# Patient Record
Sex: FEMALE | Race: WHITE | Employment: OTHER | URBAN - METROPOLITAN AREA
[De-identification: names, ages, dates, MRNs, and addresses within clinical notes are randomized per-mention and may not be internally consistent; named-entity substitution may affect disease eponyms.]

---

## 2018-11-02 ENCOUNTER — TELEPHONE (OUTPATIENT)
Dept: ADMINISTRATIVE | Age: 83
End: 2018-11-02

## 2018-11-07 ENCOUNTER — OFFICE VISIT (OUTPATIENT)
Dept: ENDOCRINOLOGY | Age: 83
End: 2018-11-07
Payer: MEDICARE

## 2018-11-07 VITALS
DIASTOLIC BLOOD PRESSURE: 78 MMHG | SYSTOLIC BLOOD PRESSURE: 124 MMHG | OXYGEN SATURATION: 96 % | WEIGHT: 108.6 LBS | RESPIRATION RATE: 16 BRPM | HEIGHT: 62 IN | BODY MASS INDEX: 19.98 KG/M2 | HEART RATE: 83 BPM

## 2018-11-07 DIAGNOSIS — E55.9 VITAMIN D DEFICIENCY: ICD-10-CM

## 2018-11-07 DIAGNOSIS — M81.0 AGE-RELATED OSTEOPOROSIS WITHOUT CURRENT PATHOLOGICAL FRACTURE: ICD-10-CM

## 2018-11-07 DIAGNOSIS — I10 ESSENTIAL HYPERTENSION: ICD-10-CM

## 2018-11-07 DIAGNOSIS — E78.49 OTHER HYPERLIPIDEMIA: ICD-10-CM

## 2018-11-07 PROCEDURE — 99214 OFFICE O/P EST MOD 30 MIN: CPT | Performed by: INTERNAL MEDICINE

## 2018-11-07 RX ORDER — DEXLANSOPRAZOLE 60 MG/1
60 CAPSULE, DELAYED RELEASE ORAL DAILY
COMMUNITY

## 2018-11-07 RX ORDER — SIMVASTATIN 5 MG
5 TABLET ORAL NIGHTLY
COMMUNITY

## 2018-11-07 RX ORDER — BRIMONIDINE TARTRATE, TIMOLOL MALEATE 2; 5 MG/ML; MG/ML
1 SOLUTION/ DROPS OPHTHALMIC EVERY 12 HOURS
COMMUNITY

## 2018-11-07 RX ORDER — METOPROLOL SUCCINATE 25 MG/1
25 TABLET, EXTENDED RELEASE ORAL 2 TIMES DAILY
COMMUNITY
Start: 2017-06-08

## 2018-11-07 RX ORDER — FENOFIBRATE 48 MG/1
48 TABLET, COATED ORAL DAILY
COMMUNITY

## 2018-11-07 RX ORDER — BENZTROPINE MESYLATE 1 MG/1
TABLET ORAL
COMMUNITY
Start: 2018-10-15 | End: 2019-11-07

## 2018-11-07 RX ORDER — BIMATOPROST 0.3 MG/ML
1 SOLUTION/ DROPS OPHTHALMIC NIGHTLY
COMMUNITY

## 2018-11-07 RX ORDER — UBIDECARENONE 75 MG
CAPSULE ORAL
COMMUNITY
Start: 2018-07-10 | End: 2018-11-07

## 2018-11-07 NOTE — LETTER
every other day.  simvastatin (ZOCOR) 5 MG tablet Take 5 mg by mouth nightly       metoprolol succinate (TOPROL XL) 25 MG extended release tablet Take 25 mg by mouth 2 times daily       fenofibrate (TRICOR) 48 MG tablet Take 48 mg by mouth daily       dexlansoprazole (DEXILANT) 60 MG CPDR delayed release capsule Take 60 mg by mouth daily       Cholecalciferol (VITAMIN D3) 1000 UNIT/SPRAY LIQD Take by mouth daily       brimonidine-timolol (COMBIGAN) 0.2-0.5 % ophthalmic solution Place 1 drop into both eyes every 12 hours       bimatoprost (LUMIGAN) 0.03 % ophthalmic drops Place 1 drop into both eyes nightly       benztropine (COGENTIN) 1 MG tablet TAKE 1 TABLET BY MOUTH TWICE A DAY      denosumab (PROLIA) 60 MG/ML SOLN SC injection Inject 1 mL into the skin once for 1 dose 6 months 1 Syringe 2     No current facility-administered medications for this visit. Review of Systems    Constitutional: No fever, no chills, no diaphoresis, no generalized weakness. HEENT: No blurred vision, No sore throat, no ear pain, no hair loss  Neck: denied any neck swelling, difficulty swallowing,   Cadrdiopulomary: No CP, SOB or palpitation, No orthopnea or PND. No cough or wheezing. GI: No N/V/D, no constipation, No abdominal pain, no melena or hematochezia   : Denied any dysuria, hematuria, flank pain, discharge, or incontinence. Skin: denied any rash, ulcer, Hirsute, or hyperpigmentation. MSK: denied any joint deformity, joint pain/swelling, muscle pain, or back pain. Neuro: no numbess, no tingling, no weakness, __  Psychiatric/Behavioral: Negative for sleep disturbance and dysphoric mood. The patient is not nervous/anxious.      Objective:   /78 (Site: Left Upper Arm, Position: Sitting, Cuff Size: Small Adult)   Pulse 83   Resp 16   Ht 5' 2\" (1.575 m)   Wt 108 lb 9.6 oz (49.3 kg)   SpO2 96%   BMI 19.86 kg/m²    BP Readings from Last 4 Encounters:   11/07/18 124/78     Wt Readings from Last 6

## 2018-11-07 NOTE — PROGRESS NOTES
Hypertension     Macular degeneration     Osteoporosis     Spinal stenosis     Vitamin D deficiency     Zenker diverticulum      PAST SURGICAL HISTORY   Past Surgical History:   Procedure Laterality Date    APPENDECTOMY      CATARACT REMOVAL      HAND SURGERY      OVARIAN CYST REMOVAL      TONSILLECTOMY       SOCIAL HISTORY   Social History     Social History    Marital status:      Spouse name: N/A    Number of children: N/A    Years of education: N/A     Occupational History    Not on file. Social History Main Topics    Smoking status: Never Smoker    Smokeless tobacco: Never Used    Alcohol use No    Drug use: No    Sexual activity: Not on file     Other Topics Concern    Not on file     Social History Narrative    No narrative on file     FAMILY HISTORY   Family History   Problem Relation Age of Onset    Thyroid Disease Mother     Heart Disease Mother     Stroke Mother     High Cholesterol Mother     Heart Disease Father        ALLERGIES AND DRUG REACTIONS   Allergies   Allergen Reactions    Codeine     Metaxalone     Oxycodone     Tramadol        CURRENT MEDICATIONS     Current Outpatient Prescriptions   Medication Sig Dispense Refill    Acetaminophen (TYLENOL ARTHRITIS PAIN PO) Take 2 tablets by mouth      aspirin 81 MG tablet Take 1 tablet by mouth every other day.       simvastatin (ZOCOR) 5 MG tablet Take 5 mg by mouth nightly       metoprolol succinate (TOPROL XL) 25 MG extended release tablet Take 25 mg by mouth 2 times daily       fenofibrate (TRICOR) 48 MG tablet Take 48 mg by mouth daily       dexlansoprazole (DEXILANT) 60 MG CPDR delayed release capsule Take 60 mg by mouth daily       Cholecalciferol (VITAMIN D3) 1000 UNIT/SPRAY LIQD Take by mouth daily       brimonidine-timolol (COMBIGAN) 0.2-0.5 % ophthalmic solution Place 1 drop into both eyes every 12 hours       bimatoprost (LUMIGAN) 0.03 % ophthalmic drops Place 1 drop into both eyes nightly  benztropine (COGENTIN) 1 MG tablet TAKE 1 TABLET BY MOUTH TWICE A DAY      denosumab (PROLIA) 60 MG/ML SOLN SC injection Inject 1 mL into the skin once for 1 dose 6 months 1 Syringe 2     No current facility-administered medications for this visit. Review of Systems    Constitutional: No fever, no chills, no diaphoresis, no generalized weakness. HEENT: No blurred vision, No sore throat, no ear pain, no hair loss  Neck: denied any neck swelling, difficulty swallowing,   Cadrdiopulomary: No CP, SOB or palpitation, No orthopnea or PND. No cough or wheezing. GI: No N/V/D, no constipation, No abdominal pain, no melena or hematochezia   : Denied any dysuria, hematuria, flank pain, discharge, or incontinence. Skin: denied any rash, ulcer, Hirsute, or hyperpigmentation. MSK: denied any joint deformity, joint pain/swelling, muscle pain, or back pain. Neuro: no numbess, no tingling, no weakness, __  Psychiatric/Behavioral: Negative for sleep disturbance and dysphoric mood. The patient is not nervous/anxious. Objective:   /78 (Site: Left Upper Arm, Position: Sitting, Cuff Size: Small Adult)   Pulse 83   Resp 16   Ht 5' 2\" (1.575 m)   Wt 108 lb 9.6 oz (49.3 kg)   SpO2 96%   BMI 19.86 kg/m²   BP Readings from Last 4 Encounters:   11/07/18 124/78     Wt Readings from Last 6 Encounters:   11/07/18 108 lb 9.6 oz (49.3 kg)       Physical examination:    General: awake alert, no abnormal position or movements. HEENT: normocephalic non traumatic. Neck: supple, no LN enlargement, no thyromegaly, no thyroid tenderness, no JVD. Pulm: clear equal air entry no added sounds,  CVS: S1 + S2, no murmur, no heave. Abd: soft lax no tenderness, no organomegaly, audible bowel sounds. MSK: no back deformity, no local spine tesnderness  Skin: warm, no lesions, no rash.  No striae no Bruises   Neuro: CN intact, sensation notmal , muscle power normal  Psych: normal mood, and affect     Lab review   No results

## 2018-11-08 LAB
ALBUMIN SERPL-MCNC: 3.9 G/DL
ALP BLD-CCNC: NORMAL U/L
ALT SERPL-CCNC: NORMAL U/L
ANION GAP SERPL CALCULATED.3IONS-SCNC: NORMAL MMOL/L
AST SERPL-CCNC: NORMAL U/L
BILIRUB SERPL-MCNC: NORMAL MG/DL (ref 0.1–1.4)
BUN BLDV-MCNC: NORMAL MG/DL
CALCIUM SERPL-MCNC: 9.5 MG/DL
CHLORIDE BLD-SCNC: 104 MMOL/L
CO2: NORMAL MMOL/L
CREAT SERPL-MCNC: 1.12 MG/DL
CREATININE, URINE: 83.7
GFR CALCULATED: NORMAL
GLUCOSE BLD-MCNC: 92 MG/DL
MICROALBUMIN/CREAT 24H UR: 12 MG/G{CREAT}
MICROALBUMIN/CREAT UR-RTO: 14.3
POTASSIUM SERPL-SCNC: 4.7 MMOL/L
SODIUM BLD-SCNC: 141 MMOL/L
TOTAL PROTEIN: NORMAL

## 2018-11-09 ENCOUNTER — TELEPHONE (OUTPATIENT)
Dept: ENDOCRINOLOGY | Age: 83
End: 2018-11-09

## 2018-11-09 DIAGNOSIS — E55.9 VITAMIN D DEFICIENCY: ICD-10-CM

## 2018-11-09 DIAGNOSIS — M81.0 AGE-RELATED OSTEOPOROSIS WITHOUT CURRENT PATHOLOGICAL FRACTURE: ICD-10-CM

## 2018-11-09 DIAGNOSIS — I10 ESSENTIAL HYPERTENSION: ICD-10-CM

## 2019-07-22 DIAGNOSIS — M81.0 AGE-RELATED OSTEOPOROSIS WITHOUT CURRENT PATHOLOGICAL FRACTURE: Primary | ICD-10-CM

## 2019-07-25 ENCOUNTER — TELEPHONE (OUTPATIENT)
Dept: ENDOCRINOLOGY | Age: 84
End: 2019-07-25

## 2019-07-25 DIAGNOSIS — M81.0 AGE-RELATED OSTEOPOROSIS WITHOUT CURRENT PATHOLOGICAL FRACTURE: Primary | ICD-10-CM

## 2019-11-07 ENCOUNTER — OFFICE VISIT (OUTPATIENT)
Dept: ENDOCRINOLOGY | Age: 84
End: 2019-11-07
Payer: MEDICARE

## 2019-11-07 VITALS
HEIGHT: 62 IN | DIASTOLIC BLOOD PRESSURE: 68 MMHG | OXYGEN SATURATION: 94 % | RESPIRATION RATE: 18 BRPM | BODY MASS INDEX: 20.17 KG/M2 | HEART RATE: 73 BPM | WEIGHT: 109.6 LBS | SYSTOLIC BLOOD PRESSURE: 118 MMHG

## 2019-11-07 DIAGNOSIS — E55.9 VITAMIN D DEFICIENCY: ICD-10-CM

## 2019-11-07 DIAGNOSIS — E78.49 OTHER HYPERLIPIDEMIA: ICD-10-CM

## 2019-11-07 DIAGNOSIS — I10 ESSENTIAL HYPERTENSION: ICD-10-CM

## 2019-11-07 DIAGNOSIS — M81.0 AGE-RELATED OSTEOPOROSIS WITHOUT CURRENT PATHOLOGICAL FRACTURE: Primary | ICD-10-CM

## 2019-11-07 PROCEDURE — 99214 OFFICE O/P EST MOD 30 MIN: CPT | Performed by: INTERNAL MEDICINE

## 2019-11-07 PROCEDURE — 1123F ACP DISCUSS/DSCN MKR DOCD: CPT | Performed by: INTERNAL MEDICINE

## 2019-11-07 PROCEDURE — G8484 FLU IMMUNIZE NO ADMIN: HCPCS | Performed by: INTERNAL MEDICINE

## 2019-11-07 PROCEDURE — 1090F PRES/ABSN URINE INCON ASSESS: CPT | Performed by: INTERNAL MEDICINE

## 2019-11-07 PROCEDURE — G8420 CALC BMI NORM PARAMETERS: HCPCS | Performed by: INTERNAL MEDICINE

## 2019-11-07 PROCEDURE — G8427 DOCREV CUR MEDS BY ELIG CLIN: HCPCS | Performed by: INTERNAL MEDICINE

## 2019-11-07 PROCEDURE — 4040F PNEUMOC VAC/ADMIN/RCVD: CPT | Performed by: INTERNAL MEDICINE

## 2019-11-07 PROCEDURE — 1036F TOBACCO NON-USER: CPT | Performed by: INTERNAL MEDICINE

## 2019-11-07 RX ORDER — CALCIUM CARBONATE/VITAMIN D3 600 MG-10
TABLET ORAL DAILY
COMMUNITY

## 2019-11-07 RX ORDER — CYANOCOBALAMIN (VITAMIN B-12) 1000 MCG
TABLET, SUBLINGUAL SUBLINGUAL DAILY
COMMUNITY

## 2020-01-01 ENCOUNTER — OFFICE VISIT (OUTPATIENT)
Dept: ENDOCRINOLOGY | Age: 85
End: 2020-01-01
Payer: MEDICARE

## 2020-01-01 VITALS
SYSTOLIC BLOOD PRESSURE: 110 MMHG | TEMPERATURE: 98 F | OXYGEN SATURATION: 95 % | WEIGHT: 104 LBS | DIASTOLIC BLOOD PRESSURE: 68 MMHG | BODY MASS INDEX: 19.02 KG/M2 | HEART RATE: 65 BPM

## 2020-01-01 PROCEDURE — G8484 FLU IMMUNIZE NO ADMIN: HCPCS | Performed by: INTERNAL MEDICINE

## 2020-01-01 PROCEDURE — 1123F ACP DISCUSS/DSCN MKR DOCD: CPT | Performed by: INTERNAL MEDICINE

## 2020-01-01 PROCEDURE — G8427 DOCREV CUR MEDS BY ELIG CLIN: HCPCS | Performed by: INTERNAL MEDICINE

## 2020-01-01 PROCEDURE — G8420 CALC BMI NORM PARAMETERS: HCPCS | Performed by: INTERNAL MEDICINE

## 2020-01-01 PROCEDURE — 99214 OFFICE O/P EST MOD 30 MIN: CPT | Performed by: INTERNAL MEDICINE

## 2020-01-01 PROCEDURE — 1036F TOBACCO NON-USER: CPT | Performed by: INTERNAL MEDICINE

## 2020-01-01 PROCEDURE — 1090F PRES/ABSN URINE INCON ASSESS: CPT | Performed by: INTERNAL MEDICINE

## 2020-01-01 PROCEDURE — 4040F PNEUMOC VAC/ADMIN/RCVD: CPT | Performed by: INTERNAL MEDICINE

## 2020-01-01 RX ORDER — DENOSUMAB 60 MG/ML
INJECTION SUBCUTANEOUS
Qty: 1 ML | Refills: 1 | Status: SHIPPED | OUTPATIENT
Start: 2020-01-01

## 2020-11-09 NOTE — PROGRESS NOTES
[address] [place] [address] Department of Endocrinology Diabetes and Metabolism   [address] [facility] [address]   Phone: [phone]  Fax: [phone]      Date of Service: 11/9/2020    Primary Care Physician: aKtina Patel. Provider: Christina Hernandez MD    Reason for the visit:  Osteoporosis, vitD deficiency     HPI  The history is provided by the patient. No  was used. Accuracy of the patient data is excellent. Olga Freeman is a 80y.o. year old female who presents follow up appointment for osteoporosis and vitD deficiency     Pt is high risk for fracture with age and DXA scan results. Pt  has history of hiatal hernia and Zenker's diverticulum. She has been followed by GI for these conditions. She has elected to not proceed with any surgical intervention. She is managing her symptoms with special swallowing techniques. Reports difficulty swallowing pills and has to crush many of them. Has taking oral bisphosphonates in the past and is concerned with size of pills and wasn't able to tolerate them with her swallowing difficulties.     Doing well today, no falls     The patient was started on Prolia in 4//2016 and tolerating it very well    DEXA scan 1/20/2016 3:28 PM  Hip T-score of -1.6 --> BMD has decreased by 6% since the prior study of 08/30/2011 and decreased by 12.0% since the baseline study of 12/08/2000   LST-score of -0.7 --> BMD has increased by 7.4% since the prior study of 08/30/2011 and increased by 5.5% since the baseline study of 12/08/2000     Most recent DXA scan 8/23/2018  Fem Neck T-score of -1.3  LS T-score of -0.6  Rt forearm T score of -2.8    Still on vitD3 1000 iu/day     Pt continue to be active and denied any recent fall     PAST MEDICAL HISTORY   Past Medical History:   Diagnosis Date    Chronic kidney disease     Hyperlipidemia     Hypertension     Macular degeneration     Osteoporosis     Spinal stenosis     Vitamin D deficiency     Zenker diverticulum      PAST SURGICAL HISTORY   Past Surgical History:   Procedure Laterality Date    APPENDECTOMY      CATARACT REMOVAL      HAND SURGERY      OVARIAN CYST REMOVAL      TONSILLECTOMY       SOCIAL HISTORY   Tobacco:   reports that she has never smoked. She has never used smokeless tobacco.  Alcol:   reports no history of alcohol use. Illicit Drugs:   reports no history of drug use. FAMILY HISTORY   Family History   Problem Relation Age of Onset    Thyroid Disease Mother     Heart Disease Mother     Stroke Mother     High Cholesterol Mother     Heart Disease Father        ALLERGIES AND DRUG REACTIONS   Allergies   Allergen Reactions    Codeine     Metaxalone     Oxycodone     Tramadol        CURRENT MEDICATIONS     Current Outpatient Medications   Medication Sig Dispense Refill    Calcium Carb-Cholecalciferol (CALCIUM-VITAMIN D) 600-400 MG-UNIT TABS Take by mouth daily      Cyanocobalamin (VITAMIN B-12) 500 MCG SUBL Place under the tongue daily      denosumab (PROLIA) 60 MG/ML SOSY SC injection Every 6 months 1 mL 2    Acetaminophen (TYLENOL ARTHRITIS PAIN PO) Take 2 tablets by mouth      aspirin 81 MG tablet daily       simvastatin (ZOCOR) 5 MG tablet Take 5 mg by mouth nightly       metoprolol succinate (TOPROL XL) 25 MG extended release tablet Take 25 mg by mouth 2 times daily       fenofibrate (TRICOR) 48 MG tablet Take 48 mg by mouth daily       dexlansoprazole (DEXILANT) 60 MG CPDR delayed release capsule Take 60 mg by mouth daily       brimonidine-timolol (COMBIGAN) 0.2-0.5 % ophthalmic solution Place 1 drop into both eyes every 12 hours       bimatoprost (LUMIGAN) 0.03 % ophthalmic drops Place 1 drop into both eyes nightly        No current facility-administered medications for this visit. Review of Systems  Constitutional: No fever, no chills, no diaphoresis, no generalized weakness.   HEENT: No blurred vision, No sore throat, no ear pain, no hair Kortney Loving MD  Endocrinologist, [facility])   [address] [facility], [address] [facility],[address] [address]   Phone: [phone]  Fax: [phone]  -----------------------------  An electronic signature was used to authenticate this note.  Roderick Sandhoff, MD on 11/9/2020 at 12:47 PM

## 2021-01-01 ENCOUNTER — APPOINTMENT (OUTPATIENT)
Dept: CT IMAGING | Age: 86
DRG: 964 | End: 2021-01-01
Payer: MEDICARE

## 2021-01-01 ENCOUNTER — IMMUNIZATION (OUTPATIENT)
Dept: PRIMARY CARE CLINIC | Age: 86
End: 2021-01-01
Payer: MEDICARE

## 2021-01-01 ENCOUNTER — HOSPITAL ENCOUNTER (INPATIENT)
Age: 86
LOS: 1 days | DRG: 964 | End: 2021-03-15
Attending: EMERGENCY MEDICINE | Admitting: SURGERY
Payer: MEDICARE

## 2021-01-01 ENCOUNTER — APPOINTMENT (OUTPATIENT)
Dept: GENERAL RADIOLOGY | Age: 86
DRG: 964 | End: 2021-01-01
Payer: MEDICARE

## 2021-01-01 VITALS
TEMPERATURE: 97.5 F | BODY MASS INDEX: 20.16 KG/M2 | OXYGEN SATURATION: 94 % | SYSTOLIC BLOOD PRESSURE: 110 MMHG | HEIGHT: 60 IN | RESPIRATION RATE: 32 BRPM | DIASTOLIC BLOOD PRESSURE: 70 MMHG | WEIGHT: 102.7 LBS | HEART RATE: 89 BPM
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
## 2021-01-01 DIAGNOSIS — S09.90XA INJURY OF HEAD, INITIAL ENCOUNTER: ICD-10-CM

## 2021-01-01 DIAGNOSIS — S32.9XXA CLOSED NONDISPLACED FRACTURE OF PELVIS, UNSPECIFIED PART OF PELVIS, INITIAL ENCOUNTER (HCC): ICD-10-CM

## 2021-01-01 LAB
ABO/RH: NORMAL
ACETAMINOPHEN LEVEL: 16.2 MCG/ML (ref 10–30)
ALBUMIN SERPL-MCNC: 4.2 G/DL (ref 3.5–5.2)
ALP BLD-CCNC: 35 U/L (ref 35–104)
ALT SERPL-CCNC: 14 U/L (ref 0–32)
ANGLE (CLOT STRENGTH): 73.2 DEGREE (ref 59–74)
ANION GAP SERPL CALCULATED.3IONS-SCNC: 9 MMOL/L (ref 7–16)
ANTIBODY SCREEN: NORMAL
APTT: 30.3 SEC (ref 24.5–35.1)
AST SERPL-CCNC: 28 U/L (ref 0–31)
BILIRUB SERPL-MCNC: 0.5 MG/DL (ref 0–1.2)
BUN BLDV-MCNC: 18 MG/DL (ref 8–23)
CALCIUM SERPL-MCNC: 9.1 MG/DL (ref 8.6–10.2)
CHLORIDE BLD-SCNC: 105 MMOL/L (ref 98–107)
CO2: 24 MMOL/L (ref 22–29)
CREAT SERPL-MCNC: 0.9 MG/DL (ref 0.5–1)
EPL-TEG: 0.1 % (ref 0–15)
ETHANOL: <10 MG/DL (ref 0–0.08)
G-TEG: 9.9 K D/SC (ref 4.5–11)
GFR AFRICAN AMERICAN: >60
GFR NON-AFRICAN AMERICAN: 58 ML/MIN/1.73
GLUCOSE BLD-MCNC: 134 MG/DL (ref 74–99)
HCT VFR BLD CALC: 26.6 % (ref 34–48)
HCT VFR BLD CALC: 32.3 % (ref 34–48)
HEMOGLOBIN: 10.4 G/DL (ref 11.5–15.5)
HEMOGLOBIN: 8.3 G/DL (ref 11.5–15.5)
INR BLD: 1.1
K (CLOTTING TIME): 1.1 MIN (ref 1–3)
LACTIC ACID: 0.9 MMOL/L (ref 0.5–2.2)
LACTIC ACID: 3.9 MMOL/L (ref 0.5–2.2)
LY30 (FIBRINOLYSIS): 0.1 % (ref 0–8)
MA (MAX AMPLITUDE): 66.5 MM (ref 50–70)
MCH RBC QN AUTO: 30.9 PG (ref 26–35)
MCHC RBC AUTO-ENTMCNC: 32.2 % (ref 32–34.5)
MCV RBC AUTO: 95.8 FL (ref 80–99.9)
PDW BLD-RTO: 14.6 FL (ref 11.5–15)
PLATELET # BLD: 211 E9/L (ref 130–450)
PMV BLD AUTO: 10.2 FL (ref 7–12)
POTASSIUM SERPL-SCNC: 4.8 MMOL/L (ref 3.5–5)
PROTHROMBIN TIME: 11.7 SEC (ref 9.3–12.4)
R (REACTION TIME): 4.1 MIN (ref 5–10)
RBC # BLD: 3.37 E12/L (ref 3.5–5.5)
SALICYLATE, SERUM: <0.3 MG/DL (ref 0–30)
SARS-COV-2, NAAT: NOT DETECTED
SODIUM BLD-SCNC: 138 MMOL/L (ref 132–146)
TOTAL PROTEIN: 6.5 G/DL (ref 6.4–8.3)
TRICYCLIC ANTIDEPRESSANTS SCREEN SERUM: NEGATIVE NG/ML
WBC # BLD: 7.9 E9/L (ref 4.5–11.5)

## 2021-01-01 PROCEDURE — 0002A COVID-19, PFIZER VACCINE 30MCG/0.3ML DOSE: CPT | Performed by: NURSE PRACTITIONER

## 2021-01-01 PROCEDURE — 74176 CT ABD & PELVIS W/O CONTRAST: CPT

## 2021-01-01 PROCEDURE — 85018 HEMOGLOBIN: CPT

## 2021-01-01 PROCEDURE — 99223 1ST HOSP IP/OBS HIGH 75: CPT | Performed by: SURGERY

## 2021-01-01 PROCEDURE — 6360000002 HC RX W HCPCS: Performed by: SURGERY

## 2021-01-01 PROCEDURE — 85610 PROTHROMBIN TIME: CPT

## 2021-01-01 PROCEDURE — 6370000000 HC RX 637 (ALT 250 FOR IP): Performed by: SURGERY

## 2021-01-01 PROCEDURE — 70486 CT MAXILLOFACIAL W/O DYE: CPT

## 2021-01-01 PROCEDURE — 85027 COMPLETE CBC AUTOMATED: CPT

## 2021-01-01 PROCEDURE — 85014 HEMATOCRIT: CPT

## 2021-01-01 PROCEDURE — 2580000003 HC RX 258: Performed by: STUDENT IN AN ORGANIZED HEALTH CARE EDUCATION/TRAINING PROGRAM

## 2021-01-01 PROCEDURE — 6360000002 HC RX W HCPCS: Performed by: STUDENT IN AN ORGANIZED HEALTH CARE EDUCATION/TRAINING PROGRAM

## 2021-01-01 PROCEDURE — 86900 BLOOD TYPING SEROLOGIC ABO: CPT

## 2021-01-01 PROCEDURE — 87081 CULTURE SCREEN ONLY: CPT

## 2021-01-01 PROCEDURE — 2000000000 HC ICU R&B

## 2021-01-01 PROCEDURE — 99285 EMERGENCY DEPT VISIT HI MDM: CPT

## 2021-01-01 PROCEDURE — 91300 COVID-19, PFIZER VACCINE 30MCG/0.3ML DOSE: CPT | Performed by: NURSE PRACTITIONER

## 2021-01-01 PROCEDURE — 80307 DRUG TEST PRSMV CHEM ANLYZR: CPT

## 2021-01-01 PROCEDURE — 6810039000 HC L1 TRAUMA ALERT

## 2021-01-01 PROCEDURE — 36600 WITHDRAWAL OF ARTERIAL BLOOD: CPT | Performed by: SURGERY

## 2021-01-01 PROCEDURE — 36000 PLACE NEEDLE IN VEIN: CPT | Performed by: SURGERY

## 2021-01-01 PROCEDURE — 36410 VNPNXR 3YR/> PHY/QHP DX/THER: CPT | Performed by: SURGERY

## 2021-01-01 PROCEDURE — 85384 FIBRINOGEN ACTIVITY: CPT

## 2021-01-01 PROCEDURE — 91300 COVID-19, PFIZER VACCINE 30MCG/0.3ML DOSE: CPT | Performed by: PHYSICIAN ASSISTANT

## 2021-01-01 PROCEDURE — 72128 CT CHEST SPINE W/O DYE: CPT

## 2021-01-01 PROCEDURE — 86850 RBC ANTIBODY SCREEN: CPT

## 2021-01-01 PROCEDURE — 85576 BLOOD PLATELET AGGREGATION: CPT

## 2021-01-01 PROCEDURE — 72125 CT NECK SPINE W/O DYE: CPT

## 2021-01-01 PROCEDURE — 80179 DRUG ASSAY SALICYLATE: CPT

## 2021-01-01 PROCEDURE — 72170 X-RAY EXAM OF PELVIS: CPT

## 2021-01-01 PROCEDURE — 86901 BLOOD TYPING SEROLOGIC RH(D): CPT

## 2021-01-01 PROCEDURE — 96374 THER/PROPH/DIAG INJ IV PUSH: CPT

## 2021-01-01 PROCEDURE — 0001A COVID-19, PFIZER VACCINE 30MCG/0.3ML DOSE: CPT | Performed by: PHYSICIAN ASSISTANT

## 2021-01-01 PROCEDURE — 85347 COAGULATION TIME ACTIVATED: CPT

## 2021-01-01 PROCEDURE — 87635 SARS-COV-2 COVID-19 AMP PRB: CPT

## 2021-01-01 PROCEDURE — 72131 CT LUMBAR SPINE W/O DYE: CPT

## 2021-01-01 PROCEDURE — 80053 COMPREHEN METABOLIC PANEL: CPT

## 2021-01-01 PROCEDURE — 36415 COLL VENOUS BLD VENIPUNCTURE: CPT

## 2021-01-01 PROCEDURE — 82077 ASSAY SPEC XCP UR&BREATH IA: CPT

## 2021-01-01 PROCEDURE — 71250 CT THORAX DX C-: CPT

## 2021-01-01 PROCEDURE — 70450 CT HEAD/BRAIN W/O DYE: CPT

## 2021-01-01 PROCEDURE — 71045 X-RAY EXAM CHEST 1 VIEW: CPT

## 2021-01-01 PROCEDURE — 85730 THROMBOPLASTIN TIME PARTIAL: CPT

## 2021-01-01 PROCEDURE — 80143 DRUG ASSAY ACETAMINOPHEN: CPT

## 2021-01-01 PROCEDURE — 83605 ASSAY OF LACTIC ACID: CPT

## 2021-01-01 RX ORDER — FENOFIBRATE 48 MG/1
48 TABLET, COATED ORAL DAILY
COMMUNITY

## 2021-01-01 RX ORDER — ACETAMINOPHEN 650 MG/1
650 SUPPOSITORY RECTAL
Status: DISCONTINUED | OUTPATIENT
Start: 2021-01-01 | End: 2021-01-01

## 2021-01-01 RX ORDER — LEVETIRACETAM 5 MG/ML
500 INJECTION INTRAVASCULAR EVERY 12 HOURS
Status: DISCONTINUED | OUTPATIENT
Start: 2021-01-01 | End: 2021-03-16 | Stop reason: HOSPADM

## 2021-01-01 RX ORDER — FENTANYL CITRATE 50 UG/ML
25 INJECTION, SOLUTION INTRAMUSCULAR; INTRAVENOUS ONCE
Status: COMPLETED | OUTPATIENT
Start: 2021-01-01 | End: 2021-01-01

## 2021-01-01 RX ORDER — POLYETHYLENE GLYCOL 3350 17 G/17G
17 POWDER, FOR SOLUTION ORAL DAILY
Status: DISCONTINUED | OUTPATIENT
Start: 2021-01-01 | End: 2021-03-16 | Stop reason: HOSPADM

## 2021-01-01 RX ORDER — BENZTROPINE MESYLATE 1 MG/1
1 TABLET ORAL 2 TIMES DAILY
COMMUNITY

## 2021-01-01 RX ORDER — LABETALOL HYDROCHLORIDE 5 MG/ML
INJECTION, SOLUTION INTRAVENOUS DAILY PRN
Status: COMPLETED | OUTPATIENT
Start: 2021-01-01 | End: 2021-01-01

## 2021-01-01 RX ORDER — BISACODYL 10 MG
10 SUPPOSITORY, RECTAL RECTAL ONCE
Status: COMPLETED | OUTPATIENT
Start: 2021-01-01 | End: 2021-01-01

## 2021-01-01 RX ORDER — SENNA AND DOCUSATE SODIUM 50; 8.6 MG/1; MG/1
2 TABLET, FILM COATED ORAL DAILY
Status: DISCONTINUED | OUTPATIENT
Start: 2021-01-01 | End: 2021-03-16 | Stop reason: HOSPADM

## 2021-01-01 RX ORDER — SODIUM CHLORIDE 0.9 % (FLUSH) 0.9 %
10 SYRINGE (ML) INJECTION PRN
Status: DISCONTINUED | OUTPATIENT
Start: 2021-01-01 | End: 2021-03-16 | Stop reason: HOSPADM

## 2021-01-01 RX ORDER — LEVETIRACETAM 10 MG/ML
1000 INJECTION INTRAVASCULAR ONCE
Status: COMPLETED | OUTPATIENT
Start: 2021-01-01 | End: 2021-01-01

## 2021-01-01 RX ORDER — ONDANSETRON 2 MG/ML
4 INJECTION INTRAMUSCULAR; INTRAVENOUS EVERY 6 HOURS PRN
Status: DISCONTINUED | OUTPATIENT
Start: 2021-01-01 | End: 2021-03-16 | Stop reason: HOSPADM

## 2021-01-01 RX ORDER — BISACODYL 10 MG
10 SUPPOSITORY, RECTAL RECTAL DAILY PRN
Status: DISCONTINUED | OUTPATIENT
Start: 2021-01-01 | End: 2021-03-16 | Stop reason: HOSPADM

## 2021-01-01 RX ORDER — SODIUM CHLORIDE 0.9 % (FLUSH) 0.9 %
10 SYRINGE (ML) INJECTION
Status: DISCONTINUED | OUTPATIENT
Start: 2021-01-01 | End: 2021-01-01

## 2021-01-01 RX ORDER — ACETAMINOPHEN 325 MG/1
650 TABLET ORAL EVERY 4 HOURS PRN
Status: DISCONTINUED | OUTPATIENT
Start: 2021-01-01 | End: 2021-03-16 | Stop reason: HOSPADM

## 2021-01-01 RX ORDER — LIDOCAINE 4 G/G
1 PATCH TOPICAL DAILY
Status: DISCONTINUED | OUTPATIENT
Start: 2021-01-01 | End: 2021-03-16 | Stop reason: HOSPADM

## 2021-01-01 RX ORDER — FENTANYL CITRATE 50 UG/ML
INJECTION, SOLUTION INTRAMUSCULAR; INTRAVENOUS
Status: DISCONTINUED
Start: 2021-01-01 | End: 2021-01-01 | Stop reason: WASHOUT

## 2021-01-01 RX ORDER — SODIUM CHLORIDE 9 MG/ML
INJECTION, SOLUTION INTRAVENOUS CONTINUOUS
Status: DISCONTINUED | OUTPATIENT
Start: 2021-01-01 | End: 2021-01-01

## 2021-01-01 RX ORDER — ACETAMINOPHEN 325 MG/1
650 TABLET ORAL EVERY 4 HOURS
Status: DISCONTINUED | OUTPATIENT
Start: 2021-01-01 | End: 2021-01-01

## 2021-01-01 RX ORDER — MORPHINE SULFATE 2 MG/ML
1 INJECTION, SOLUTION INTRAMUSCULAR; INTRAVENOUS
Status: DISCONTINUED | OUTPATIENT
Start: 2021-01-01 | End: 2021-03-16 | Stop reason: HOSPADM

## 2021-01-01 RX ORDER — SODIUM CHLORIDE 0.9 % (FLUSH) 0.9 %
10 SYRINGE (ML) INJECTION EVERY 12 HOURS SCHEDULED
Status: DISCONTINUED | OUTPATIENT
Start: 2021-01-01 | End: 2021-03-16 | Stop reason: HOSPADM

## 2021-01-01 RX ORDER — METOPROLOL SUCCINATE 25 MG/1
25 TABLET, EXTENDED RELEASE ORAL 2 TIMES DAILY
COMMUNITY

## 2021-01-01 RX ORDER — PROMETHAZINE HYDROCHLORIDE 25 MG/1
12.5 TABLET ORAL EVERY 6 HOURS PRN
Status: DISCONTINUED | OUTPATIENT
Start: 2021-01-01 | End: 2021-01-01

## 2021-01-01 RX ORDER — SIMVASTATIN 20 MG
20 TABLET ORAL NIGHTLY
COMMUNITY

## 2021-01-01 RX ORDER — DEXLANSOPRAZOLE 60 MG/1
60 CAPSULE, DELAYED RELEASE ORAL DAILY
COMMUNITY

## 2021-01-01 RX ORDER — BRIMONIDINE TARTRATE, TIMOLOL MALEATE 2; 5 MG/ML; MG/ML
1 SOLUTION/ DROPS OPHTHALMIC EVERY 12 HOURS
COMMUNITY

## 2021-01-01 RX ADMIN — FENTANYL CITRATE 25 MCG: 50 INJECTION, SOLUTION INTRAMUSCULAR; INTRAVENOUS at 13:27

## 2021-01-01 RX ADMIN — ACETAMINOPHEN 650 MG: 650 SUPPOSITORY RECTAL at 18:19

## 2021-01-01 RX ADMIN — Medication 1 MG: at 12:29

## 2021-01-01 RX ADMIN — SODIUM CHLORIDE: 9 INJECTION, SOLUTION INTRAVENOUS at 13:31

## 2021-01-01 RX ADMIN — LABETALOL HYDROCHLORIDE 5 MG: 5 INJECTION, SOLUTION INTRAVENOUS at 10:50

## 2021-01-01 RX ADMIN — Medication 10 ML: at 21:48

## 2021-01-01 RX ADMIN — Medication 1 MG: at 00:27

## 2021-01-01 RX ADMIN — Medication 1 MG: at 18:11

## 2021-01-01 RX ADMIN — Medication 10 ML: at 12:29

## 2021-01-01 RX ADMIN — Medication 1 MG: at 17:16

## 2021-01-01 RX ADMIN — ONDANSETRON 4 MG: 2 INJECTION INTRAMUSCULAR; INTRAVENOUS at 14:51

## 2021-01-01 RX ADMIN — BISACODYL 10 MG: 10 SUPPOSITORY RECTAL at 16:29

## 2021-01-01 RX ADMIN — ONDANSETRON 4 MG: 2 INJECTION INTRAMUSCULAR; INTRAVENOUS at 00:29

## 2021-01-01 RX ADMIN — LEVETIRACETAM 500 MG: 5 INJECTION INTRAVENOUS at 00:30

## 2021-01-01 RX ADMIN — LEVETIRACETAM 1000 MG: 10 INJECTION INTRAVENOUS at 12:09

## 2021-01-01 SDOH — HEALTH STABILITY: MENTAL HEALTH: HOW OFTEN DO YOU HAVE A DRINK CONTAINING ALCOHOL?: NEVER

## 2021-01-01 SDOH — HEALTH STABILITY: MENTAL HEALTH: HOW MANY STANDARD DRINKS CONTAINING ALCOHOL DO YOU HAVE ON A TYPICAL DAY?: NOT ASKED

## 2021-01-01 ASSESSMENT — PAIN DESCRIPTION - LOCATION: LOCATION: OTHER (COMMENT)

## 2021-01-01 ASSESSMENT — PAIN SCALES - GENERAL
PAINLEVEL_OUTOF10: 8
PAINLEVEL_OUTOF10: 5
PAINLEVEL_OUTOF10: 8
PAINLEVEL_OUTOF10: 0

## 2021-01-01 ASSESSMENT — PAIN SCALES - PAIN ASSESSMENT IN ADVANCED DEMENTIA (PAINAD)
BREATHING: 1
NEGVOCALIZATION: 0
BODYLANGUAGE: 1
BODYLANGUAGE: 2
FACIALEXPRESSION: 2
FACIALEXPRESSION: 2
NEGVOCALIZATION: 0
BODYLANGUAGE: 2
CONSOLABILITY: 2
TOTALSCORE: 8
BODYLANGUAGE: 2
CONSOLABILITY: 2
TOTALSCORE: 2

## 2021-01-01 ASSESSMENT — PAIN DESCRIPTION - FREQUENCY: FREQUENCY: CONTINUOUS

## 2021-01-01 ASSESSMENT — PAIN DESCRIPTION - DESCRIPTORS: DESCRIPTORS: PATIENT UNABLE TO DESCRIBE

## 2021-01-01 ASSESSMENT — PAIN SCALES - WONG BAKER: WONGBAKER_NUMERICALRESPONSE: 6
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
## 2021-03-14 NOTE — H&P
TRAUMA HISTORY & PHYSICAL  Surgical Resident/Advance Practice Nurse  3/14/2021  10:53 AM    PRIMARY SURVEY    CHIEF COMPLAINT:  Trauma alert. Injury occurred just prior to arrival . Pete Finney 8 steps at [facility]. Questionable LOC. Dr. Mehnaz Genao present for trauma alert. AIRWAY:   Airway Abnormal  Blood in oropharynx  EMS ETT Absent  Noisy respirations Absent  Retractions: Absent  Vomiting/bleeding: Absent      BREATHING:    Midaxillary breath sound left:  Normal  Midaxillary breath sound right:  Normal    Cough sound intensity:  fair   FiO2: 15 liters/min via non-rebreather face mask   SMI Deferred    CIRCULATION:   Femerol pulse intensity: Strong  Palpebral conjunctiva: Red      Vitals:    03/14/21 1047   BP: (!) 237/71   Pulse: 87   Resp: 20   SpO2: 100%       Vitals:    03/14/21 1044 03/14/21 1045 03/14/21 1047   BP: 130/80  (!) 237/71   Pulse:  89 87   Resp:  26 20   SpO2:   100%        FAST EXAM: Deferred.      Central Nervous System    GCS Initial 15 minutes   Eye  Motor  Verbal 4 - Opens eyes on own  6 - Follows simple motor commands  1 - Makes no noise 4 - Opens eyes on own  6 - Follows simple motor commands  4 - Seems confused, disoriented     Neuromuscular blockade: No  Pupil size:  Left 4 mm    Right 4 mm  Pupil reaction: Yes    Wiggles fingers: Left Yes Right Yes  Wiggles toes: Left Yes   Right Yes    Hand grasp:   Left  Present      Right  Present  Plantar flexion: Left  Present      Right   Present    Loss of consciousness:  Unknown  History Obtained From:  Patient & EMS  Private Medical Doctor: Unknown    Pre-exisiting Medical History:  unknown    Conditions: Unknown    Medications:Unknown  Allergies: Unknown  Social History:   Tobacco use:  Unknown  Alcohol use:  patient refuses to answer  Illicit drug use:  Unknown    Past Surgical History:  Unknown    Anticoagulant use: Unknown  Antiplatelet use:    Unknown    NSAID use in last 72 hours: unknown  Taken PCN in past:  unknown  Last food/drink: Unknown  Last tetanus: Unknown    Family History:   Not pertinent to presenting problem. Complaints:  Not responding to questions. Review of systems:  All negative unless otherwise noted. SECONDARY SURVEY  Head/scalp: Atraumatic    Face: Atraumatic    Eyes/ears/nose: right nares with blood. Windy Viraj Pharynx/mouth: Atraumatic    Neck: Atraumatic     Cervical spine tenderness:   Cervical collar in place at time of arrival  Pain:  none  ROM:  Not indicated     Chest wall:  Atraumatic    Heart:  Regular rate & rhythm    Abdomen: Atraumatic. Soft ND  Tenderness:  none    Pelvis: Atraumatic  Tenderness: none    Thoracolumbar spine: Atraumatic  Tenderness: Moaned when thoracic spine palpated     Genitourinary:  Atraumatic. No blood or urine noted    Rectum: Atraumatic. No blood noted. Perineum: Atraumatic. No blood or urine noted. Extremities:   Sensory Unknown  Motor normal    Distal Pulses  Left arm normal  Right arm normal  Left leg normal  Right leg normal    Capillary refill  Left arm normal  Right arm normal  Left leg normal  Right leg normal    Procedures in ED:  Femoral arterial puncture and Femoral venipuncture    In the event of Emergency Blood Transfusion:  Due to the critical condition of this patient, I request the immediate release of blood products for emergency transfusion secondary to shock. I understand the increased risks incurred by the lack of complete transfusion testing. Radiology: Chest Xray, Pelvic Xray, Ct head, Ct cervical spine, CT chest, CT abdomen, CT Face , CT Thoracic  and CT Lumbar     Consultations: Await imaging    Admission/Diagnosis: Trauma, Fall down 8 steps. Possible TBI with LOC. Plan of Treatment:  Npo  Imaging  Cervical collar. Tertiary  Disposition.      Plan discussed with Dr. Zena Engle at 3/14/2021 on 10:53 AM    Electronically signed by DIANNA Gamble CNP on 3/14/2021 at 10:53 AM

## 2021-03-14 NOTE — CONSULTS
Department of Orthopedic Surgery  Resident Consult Note  Reason for Consult: Mechanical fall down 8 steps    HISTORY OF PRESENT ILLNESS:       Patient is a 80 y.o. female who presents with right hip pain after a fall. Patient was a trauma alert after falling down 8 steps at [employer]. There is questionable LOC at the scene but patient has altered mental status from baseline. Patient was not answering questions. When asked about associated symptoms or pain she did not answer. Past Medical History:    No past medical history on file. Past Surgical History:    No past surgical history on file. Current Medications:   Current Facility-Administered Medications: [COMPLETED] levetiracetam (KEPPRA) 1000 mg/100 mL IVPB, 1,000 mg, Intravenous, Once **FOLLOWED BY** [START ON 3/15/2021] levetiracetam (KEPPRA) 500 mg/100 mL IVPB, 500 mg, Intravenous, Q12H  Allergies:  Patient has no allergy information on record. Social History:   TOBACCO:   has no history on file for tobacco.  ETOH:   has no history on file for alcohol. DRUGS:   has no history on file for drug. ACTIVITIES OF DAILY LIVING:    OCCUPATION:    Family History:   No family history on file.     REVIEW OF SYSTEMS:  CONSTITUTIONAL:  negative for fevers, chills  EYES:  negative for acute changes  HEENT:  negative for acute changes  RESPIRATORY:  negative for dyspnea  CARDIOVASCULAR:  negative for chest pain, palpitations  GASTROINTESTINAL:  negative for nausea, vomiting  GENITOURINARY:  negative for frequency  HEMATOLOGIC/LYMPHATIC:  negative for bleeding and petechiae  MUSCULOSKELETAL:  positive for pain  NEUROLOGICAL: Positive for head trauma or LOC  BEHAVIOR/PSYCH:  negative for increased agitation and anxiety    PHYSICAL EXAM:    VITALS:  BP (!) 177/89   Pulse 74   Resp 20   SpO2 100%   CONSTITUTIONAL:  awake, alert, cooperative, no apparent distress, and appears stated age  MUSCULOSKELETAL:  Right lower Extremity:  Superficial hematoma overlying the anterior iliac crest  Superficial abrasion overlying the hematoma  Compartments soft and compressible  Unable to assess PF/DF/EHL secondary to lack of cooperation  +2/4 DP & PT pulses, Brisk Cap refill, Toes warm and perfused  Unable to assess distal sensation secondary to lack of cooperation    Secondary Exam:   · Bilateral UE: No obvious signs of trauma. -TTP to fingers, hand, wrist, forearm, elbow, humerus, shoulder or clavicle. -- +2/4 Radial pulse, cap refill <3sec, compartments soft and compressible. Unable to assess muscle strength or sensation secondary to lack of cooperation. · Left LE: No obvious signs of trauma. -TTP to foot, ankle, leg, knee, thigh, hip.-- +2/4 DP & PT pulses, cap refill <3sec, compartments soft and compressible. Unable to assess muscle strength or sensation secondary to lack of cooperation. · Pelvis: +TTP, +Log roll, -Heel strike     DATA:    CBC:   Lab Results   Component Value Date    WBC 7.9 03/14/2021    RBC 3.37 03/14/2021    HGB 10.4 03/14/2021    HCT 32.3 03/14/2021    MCV 95.8 03/14/2021    MCH 30.9 03/14/2021    MCHC 32.2 03/14/2021    RDW 14.6 03/14/2021     03/14/2021    MPV 10.2 03/14/2021     PT/INR:    Lab Results   Component Value Date    PROTIME 11.7 03/14/2021    INR 1.1 03/14/2021       Radiology Review:  X-ray AP pelvis-trauma view: Moderately displaced right iliac wing fracture. No other fractures dislocations appreciated on this view. CT abdomen pelvis: Comminuted displaced fracture of the right anterior iliac wing. No other fractures or dislocations appreciated. IMPRESSION:  · Right iliac wing fracture    PLAN:  · Weightbearing as tolerated right lower extremity  · No acute orthopedic surgical intervention indicated at this time. Patient's fracture will be managed nonoperatively.   · DVT prophylaxis per admitting service  · Pain control per admitting service  · Plan was discussed with attending    All questions were sought and answered during encounter.   Trauma service was notified of patient's mental status during the exam.

## 2021-03-14 NOTE — ED NOTES
Surgical resident perfect served for pain medications for patient.  Pt is restless and uncomfortable at this time      Marcellus Julien RN  03/14/21 7849

## 2021-03-14 NOTE — PLAN OF CARE
Problem: Pain:  Goal: Pain level will decrease  Description: Pain level will decrease  3/14/2021 1541 by Laure Vang RN  Outcome: Met This Shift  3/14/2021 1540 by Laure Vang RN  Outcome: Met This Shift  Goal: Control of acute pain  Description: Control of acute pain  3/14/2021 1541 by Laure Vang RN  Outcome: Met This Shift  3/14/2021 1540 by Laure Vang RN  Outcome: Met This Shift  Goal: Control of chronic pain  Description: Control of chronic pain  3/14/2021 1541 by Laure Vang RN  Outcome: Met This Shift  3/14/2021 1540 by Laure Vang RN  Outcome: Met This Shift     Problem: Skin Integrity:  Goal: Will show no infection signs and symptoms  Description: Will show no infection signs and symptoms  Outcome: Met This Shift  Goal: Absence of new skin breakdown  Description: Absence of new skin breakdown  Outcome: Met This Shift

## 2021-03-14 NOTE — ED NOTES
Dr Cristina Alvarez at bedside evaluating patient.  No new orders at this time      Jefrey Fothergill, RN  03/14/21 9355

## 2021-03-14 NOTE — PROGRESS NOTES
disempacted grapefruit-sized stool from rectum    Electronically signed by Swathi Shah MD on 3/14/2021 at 4:44 PM

## 2021-03-14 NOTE — ED NOTES
Log rolled maintaining spinal immoblilization-tenderness to cervical palpation, tenderness to thoracic spine, no step off or deformity     Rossi Lucas RN  03/14/21 2311

## 2021-03-14 NOTE — ED NOTES
Walked into patients room and pt has ripped c collar off, and all leads off.  C collar reapplied     Soco Verdin RN  03/14/21 1440

## 2021-03-15 NOTE — PROGRESS NOTES
Physical Therapy order received and chart reviewed. Per CM, plan is hospice. Will discontinue PT order. Please re consult if goals of care change.      Saanz Gabriel PT, DPT  [id number]

## 2021-03-15 NOTE — PROGRESS NOTES
OT consult received and appreciated. Chart reviewed. Will hold evaluation due to patient going to be discharged to hospice . Will discontinue OT orders. Thank you.  Jefferson Barnes OTR/SHAD #[id number]

## 2021-03-15 NOTE — PROGRESS NOTES
Hospice consult received. Call placed to nephew, no answer, LM.   Will follow up tomorrow with family

## 2021-03-15 NOTE — PROGRESS NOTES
Palliative Medicine   Progression of Care     Update with bedside RN. Pt has been changed now to [facility]. Full consult to follow. Please inform Palliative Care of any changes, or needs, we will plan to see the patient tomorrow.      Palliative Care following closely for support of patient and family   Francisco BUSTAMANTE-CNP, AGACNP-BC

## 2021-03-15 NOTE — CARE COORDINATION
Hospice consult noted. Placed call the pt's nephew, Radha Mcleod. He is going to go to the pt's safe deposit box and retrieve her documents. The pt lives alone in an apt. Explained that d/t the pt's condition, she will not be able to return home. Will meet with him when he arrives at the hospital. Nicky Fontaine RN    Met with the pt's nephew at the bedside. He provided the pt's living will. Copy placed in soft chart. He has chosen [facility] for [facility]. hopsice choices are 1. [facility] 2. [facility] in [place]. Phoned [facility]. They do not have a license to practice in [address] [place] [address]  Referral made to [facility].  Nicky Fontaine RN

## 2021-03-15 NOTE — CONSULTS
Palliative Medicine  Progress Note    Patient goal of care and code status have been addressed. Hospice consult in place, hospice choice made. Will defer to hospice. There are no further PM needs at this time. PM will now sign off. If new PM needs arise, please re-consult. Thank you. Ananya BUSTAMANTE-CNP  Palliative Medicine    Note: This report was completed using [employer] voiced recognition software. Every effort has been made to ensure accuracy; however, inadvertent computerized transcription errors may be present.

## 2021-03-15 NOTE — CONSULTS
[address]                  [address] [place],  [address] [address]                                  CONSULTATION    PATIENT NAME: Coral Kawasaki                       :        1923  MED REC NO:   [id number]                            ROOM:       [id number]  ACCOUNT NO:   [de-identified]                           ADMIT DATE: 2021  PROVIDER:     Mohini Beach MD    CONSULT DATE:  03/15/2021    REASON FOR CONSULTATION:  Fall with traumatic intracerebral hemorrhage. HISTORY OF PRESENT ILLNESS:  A 44-year-old female who was injured just  prior to arrival to our institution, fell down eight steps at [facility],  possible loss of consciousness, was brought to the hospital for a trauma  alert. Multiple images were obtained. CT of the head showed a left  temporoparietal subarachnoid hemorrhage. Cervical CT was unremarkable  for acute traumatic injury. CT of the lumbar spine, no acute fractures,  likely old anterolisthesis L4 and L5.  CT of the T-spine documented  degenerative changes. There is a compression fracture in the mid  thoracic vertebral body, likely T6, that is likely acute. She has a  minimally displaced fracture of the right ninth and tenth ribs  posteriorly. PHYSICAL EXAMINATION:  GENERAL:  She was lying on her side comfortably in the ICU. Her Jason  Coma scale was 11. She was confused and disoriented. EYES:  Pupils were equal, round, reactive. Extraocular movements were  full. EXTREMITIES:  Moved all fours extremities equally. DIAGNOSIS:  Traumatic brain injury. Decision was made to keep her comfort care, not to do anything  surgically aggressively, that be no neurosurgical intervention allowed. I agree with keeping her comfortable at this point.         John Hale MD    D: 03/15/2021 11:57:55       T: 03/15/2021 12:03:50     [email or web address]  Job#: [id number]     Doc#: [id number]    CC:

## 2021-03-15 NOTE — PROGRESS NOTES
Updated Dr. Barnes Jose by perfect serve that patient's nephew/POA is concerned that patient is not eating or drinking. He wants to know if fluids could be ordered for her. Patient has not urinated this shift. He would like for somebody to update him.

## 2021-03-15 NOTE — PROGRESS NOTES
Department of Orthopedic Surgery  Resident Progress Note    Patient seen and examined.  Sleep on exam. Will awake to name but not answering questions    VITALS:  BP (!) 118/46   Pulse 94   Temp 97.3 °F (36.3 °C) (Axillary)   Resp 16   Ht 5' (1.524 m)   Wt 102 lb 11.2 oz (46.6 kg)   SpO2 95%   BMI 20.06 kg/m²     GENERAL: somnolent on exam  MUSCULOSKELETAL:   right lower extremity:  · Skin C/D/I  · Compartments soft and compressible, calf non-tender  · Palpable dorsalis pedis and posterior tibialis pulse, brisk cap refill to toes, foot warm and perfused  · Motor and sensation exam limited due to patient cooperativity     CBC:   Lab Results   Component Value Date    WBC 7.9 03/14/2021    HGB 8.3 03/14/2021    HCT 26.6 03/14/2021     03/14/2021       ASSESSMENT  · S/P right Iliac wing fx    PLAN    · WBAT  · Pain control   · DVT prophylaxis- per SICU, early mobilization  · Monitor Labs  · Bowel regiment  · Pulmonary hygiene   · Trend H&H  · NO acute orthopedic interventions   · PT/OT  · D/C planning, SW/PT recs  · Discuss with attending

## 2021-03-15 NOTE — PROGRESS NOTES
Nurse to nurse report called, patient will be transferred to [address] [place] [address], updated on transfer to new bed.

## 2021-03-16 LAB — MRSA CULTURE ONLY: NORMAL

## 2021-03-16 NOTE — PROGRESS NOTES
Called in to patients room, no respiration or heart rate noted no response.  Family here,  notified, life [facility] called patient denied

## 2021-03-24 NOTE — ED PROVIDER NOTES
HPI:  3/24/21, Time: 1400. Rahul Conklin is a 80 y.o. female presenting to the ED as a trauma Fall down 8 steps at [facility], beginning several minutes ago. The complaint has been persistent, Constant question able LOC Back and pelvic pain       Please note, this patient arrived as a Trauma alert called    Initial evaluation occurred with trauma services at bedside. This patients disposition will be determined by trauma services. Glascow Coma Scale at time of initial examination  Best Eye Response 4 - Opens eyes on own   Best Verbal Response 4 - Seems confused, disoriented   Best Motor Response 6 - Follows simple motor commands   Total 14     ROS:   Pertinent positives and negatives are stated within HPI, all other systems reviewed and are negative.    --------------------------------------------- PAST HISTORY ---------------------------------------------  Past Medical History:  has a past medical history of Chronic kidney disease, Chronic pain, Hyperlipidemia, Hypertension, Macular degeneration, Osteoporosis, Spinal stenosis, Vitamin D deficiency, and Zenker diverticulum. Past Surgical History:  has a past surgical history that includes Appendectomy; Cataract removal; Hand surgery; Tonsillectomy; and ovarian cyst removal.    Social History:  reports that she has never smoked. She has never used smokeless tobacco. She reports that she does not drink alcohol or use drugs. Family History: family history includes Heart Disease in her father and mother; High Cholesterol in her mother; Stroke in her mother; Thyroid Disease in her mother. The patients home medications have been reviewed. Allergies: Codeine, Metaxalone, Oxycodone, Penicillins, and Tramadol            ------------------------- NURSING NOTES AND VITALS REVIEWED ---------------------------   The nursing notes within the ED encounter and vital signs as below have been reviewed.    /70   Pulse 89   Temp 97.5 °F (36.4 °C) K (Clotting Time) 1.1 1.0 - 3.0 min    Angle (Clot Strength) 73.2 59.0 - 74.0 degree    MA (Max Amplitude) 66.5 50.0 - 70.0 mm    G-TEG 9.9 4.5 - 11.0 K d/sc    EPL-TEG 0.1 0.0 - 15.0 %    LY30 (Fibrinolysis) 0.1 0.0 - 8.0 %   Serum Drug Screen   Result Value Ref Range    Ethanol Lvl <10 mg/dL    Acetaminophen Level 16.2 10.0 - 68.9 mcg/mL    Salicylate, Serum <0.0 0.0 - 30.0 mg/dL    TCA Scrn NEGATIVE Cutoff:300 ng/mL   LACTIC ACID, PLASMA   Result Value Ref Range    Lactic Acid 3.9 (H) 0.5 - 2.2 mmol/L   Hemoglobin and hematocrit, blood   Result Value Ref Range    Hemoglobin 8.3 (L) 11.5 - 15.5 g/dL    Hematocrit 26.6 (L) 34.0 - 48.0 %   TYPE AND SCREEN   Result Value Ref Range    ABO/Rh AB POS     Antibody Screen NEG        RADIOLOGY:  Interpreted by Radiologist.  CT CERVICAL SPINE WO CONTRAST   Final Result   1. There is no acute compression fracture or subluxation of the cervical   spine. 2. Advanced multilevel degenerative disc and degenerative joint disease. CT HEAD WO CONTRAST   Final Result   1. There are scattered areas of subarachnoid hemorrhage involving the left   temporoparietal lobes as noted above with the largest zone of subarachnoid   hemorrhage measuring approximately 25 mm in the parietal region. This most   likely represents a contrecoup injury and there are no signs of midline shift. 2. Prominent cephalohematoma involving the right extra-axial temporoparietal   muscles where there is a zone of hemorrhage measuring 78 mm in length. There   is also minimal left frontal scalp contusion. 3. Acute right maxillary and sphenoid sinusitis. 4. These findings were called to Dr. Ramin Gomes at 11:41         [facility]   Final Result   1. There is no acute compression fracture of the lumbar spine   2. 5.5 mm anterolisthesis of L4 on L5   3. Multilevel degenerative disc and degenerative joint disease most prominent   at the L4-5 level.          CT THORACIC SPINE WO These findings were called to Dr. Addi Patel at 12:08. XR PELVIS (1-2 VIEWS)   Final Result   1. There is no pelvic fracture and there is no right or left hip fracture or   dislocation. 2. Degenerative changes of the hips. XR CHEST 1 VIEW   Final Result   1. There is no pneumothorax or gross lung contusion   2. Emphysematous changes with prominence of the interstitial markings. The   lungs will be further evaluated on the pending CT scan of the chest.                 ---------------------------------------------------PHYSICAL EXAM--------------------------------------      Primary Survey:  Airway: patient, trachea midline, some blood in oropharynx  Breathing: Spontaneous, breath sounds equal bilaterally, symmetric cehst rise  Circulation: 2+ femoral pulses, 2+ DP/PT pulses  Disability: GCS 14      Constitutional/General: Alert and oriented x3, well appearing, non toxic in NAD  Head: NC/AT  Eyes: PERRL, EOMI  Ears: TMs clear with no hemotympanum  Mouth: Oropharynx clear, handling secretions, no trismus. No dental trauma, blood in oropharynx   Neck: Immobilized in cervical collar. No crepitus, no palpable lacerations, abrasions, deformities, or stepoffs. Back: No midline cervical, thoracic, lumbar spine tenderness. No Stepoffs, abrasions, lacerations, or deformities. Pulmonary: Lungs clear to auscultation bilaterally, no wheezes, rales, or rhonchi. Not in respiratory distress  Cardiovascular:  Regular rate and rhythm, no murmurs, gallops, or rubs. 2+ distal pulses  Abdomen: Soft, non tender, non distended, +BS, no rebound, guarding, or rigidity. No pulsatile masses appreciated  BACK thorac spine tenderness  Extremities: Moves all extremities x 4. Warm and well perfused, no clubbing, cyanosis, or edema.  Capillary refill <3 seconds  Skin: warm and dry without rash  Neurologic: GCS 15, CN 2-12 grossly intact, no focal deficits, symmetric strength 5/5 in the upper and lower extremities bilaterally  Psych: Normal Affect    Trauma Evaluation/Survey Conducted in accordance with ATLS Guidelines      ------------------------------ ED COURSE/MEDICAL DECISION MAKING----------------------  Medications   labetalol (NORMODYNE;TRANDATE) injection (5 mg Intravenous Given 3/14/21 1050)   levetiracetam (KEPPRA) 1000 mg/100 mL IVPB (0 mg Intravenous Stopped 3/14/21 1232)   fentaNYL (SUBLIMAZE) injection 25 mcg (25 mcg Intravenous Given 3/14/21 1327)   bisacodyl (DULCOLAX) suppository 10 mg (10 mg Rectal Given 3/14/21 1629)         Medical Decision Making:    Fall down 8 steps. BHI ? LOC Keppra CT head chest abd c/t/l spine Chest  Pelvis x arys ATLS guidelines. Re-Evaluations:             Re-evaluation. Patients symptoms show no change      Consultations:             Trauma Ortho trauma     Critical Care: none        This patient's ED course included: a personal history and physicial examination, re-evaluation prior to disposition, multiple bedside re-evaluations, complex medical decision making and emergency management and a personal history and physicial eaxmination    This patient has remained hemodynamically stable, remained unchanged and been closely monitored during their ED course. Counseling: The emergency provider has spoken with the patient and discussed todays results, in addition to providing specific details for the plan of care and counseling regarding the diagnosis and prognosis. Questions are answered at this time and they are agreeable with the plan.       --------------------------------- IMPRESSION AND DISPOSITION ---------------------------------    IMPRESSION  1. Fall, initial encounter    2. Injury of head, initial encounter    3.  Closed nondisplaced fracture of pelvis, unspecified part of pelvis, initial encounter [facility]        DISPOSITION  Disposition: as per consultation   Patient condition is 2755 Yvette Ríos DO  03/24/21 6073

## 2021-04-14 NOTE — DISCHARGE SUMMARY
Physician Discharge Summary     Patient ID:  Charissa Mora  [id number]  58 y.o.  1923    Admit date: 3/14/2021    Discharge date and time: 3/15/2021 10:23 PM     Admitting Physician: Brittney Schrader MD     Admission Diagnoses: Injury resulting from fall from height [W17.89XA]  Injury resulting from fall from height [W17.89XA]    Discharge Diagnoses: Active Problems:    Injury resulting from fall from height  Resolved Problems:    * No resolved hospital problems. *      Admission Condition: poor    Discharged Condition: stable    Indication for Admission: Trauma with TBI    Hospital Course/Procedures/Operation/treatments:   Trauma alert. Patient fell down 8 stairs at [place], possible loss of consciousness. Pan scan revealed scattered subarachnoid hemorrhages and cephalhematoma, T6 compression fracture of indeterminate age, fourth and eighth and 10th posterior rib fractures, comminuted right iliac bone fracture. Neurosurgery was consulted. Large stool ball was disimpacted from her rectum. Palliative was consulted. Patient/family decided to pursue hospice the next day. She was found  later that evening. Consults:   IP CONSULT TO ORTHOPEDIC SURGERY  IP CONSULT TO NEUROSURGERY  IP CONSULT TO SOCIAL WORK  IP CONSULT TO PALLIATIVE CARE  IP CONSULT TO HOSPICE    Significant Diagnostic Studies:   Ct Abdomen Pelvis Wo Contrast Additional Contrast? None    Result Date: 3/14/2021  EXAMINATION: CT OF THE ABDOMEN AND PELVIS WITHOUT CONTRAST 3/14/2021 11:01 am TECHNIQUE: CT of the abdomen and pelvis was performed without the administration of intravenous contrast. Multiplanar reformatted images are provided for review. Dose modulation, iterative reconstruction, and/or weight based adjustment of the mA/kV was utilized to reduce the radiation dose to as low as reasonably achievable. COMPARISON: None.  HISTORY: ORDERING SYSTEM PROVIDED HISTORY: trauma TECHNOLOGIST PROVIDED HISTORY: Reason for exam:->trauma Additional Contrast?->None What reading provider will be dictating this exam?->CRC FINDINGS: Lower Chest: There are signs of interlobular septal thickening at the lung bases bilaterally suggestive of chronic disease. There is a small rounded zone of soft tissue density measuring 7.9 mm at the left base medially. No large consolidation or pleural effusions are identified. The heart does not appear enlarged. The cardiac annulus is heavily calcified. Organs: The liver appears dense suggestive of steatosis. The gallbladder reveals a calcified stone but no wall thickening or pericholecystic fluid. The spleen, pancreas and adrenal glands reveal no acute abnormalities. The right kidney reveals several tiny nonobstructing stones. The largest calculus is noted in the inferior pole measuring 4 mm. There are additional smaller calculi in the interpolar region. The left kidney reveals no signs of stones or hydronephrosis. GI/Bowel: The stomach and small bowel pattern appear within the normal range and are nonobstructive. The area of the terminal ileum and cecum reveals no acute abnormalities. There are multiple sigmoid diverticula without signs of diverticulitis. The rectal vault is massively distended and measures 10 cm in transverse diameter compatible with fecal impaction. There are no signs of significant lymphadenopathy or ascites within the pelvis. Pelvis: The urinary bladder is not distended. No acute abnormalities of the uterus or adnexa are visualized. Evaluation is degraded technically due to distension of the rectal vault. Peritoneum/Retroperitoneum: There is a fracture of the anterior superior iliac spine and iliac crest.  There is a prominent right iliacus hemorrhage that extends caudally along the iliopsoas, rectus femoris region of the sartorius. There is lesser involvement of the gluteus minimus. The fracture line does not appear to extend into the femoral head, neck or acetabulum otherwise. Anterior and posterior columns appear intact. No acute abnormalities of the symphysis pubis are identified. I cannot exclude nondisplaced fractures. Bones/Soft Tissues: There is a fracture through the right 4th, 8th and 10th ribs. Images of lumbosacral spine reveal a 7 mm anterolisthesis of L4 on L5. There appears to be a transverse fracture through the 3rd segment of the sacrum that may be remote as there is sclerosis along its margins. 1. There is a comminuted fracture of the right iliac bone extending from the crest into the anterior superior iliac spine with marked hemorrhage of the iliacus, gluteus medius and obturator muscles extending into the proximal thigh. There are no CT signs of active hemorrhage but this may be limited in the absence of an enhanced study. 2. Right 4th, 8th and 10th rib fractures. 3. Remote-appearing sacral fracture 4. 7 mm spondylolisthesis of L4 on L5 that appears degenerative. 5. Marked distension of the rectal vault compatible with fecal impaction. 6. 7 mm soft tissue nodule left lung base medially. 7. Interlobular septal thickening at the lung bases. 8. No definite signs of visceral organ injury. 9. Cholelithiasis. 10. These findings were called to Dr. Austyn Javier at 12:08. Xr Pelvis (1-2 Views)    Result Date: 3/14/2021  EXAMINATION: ONE XRAY VIEW OF THE PELVIS 3/14/2021 10:53 am COMPARISON: None. HISTORY: ORDERING SYSTEM PROVIDED HISTORY: trauma TECHNOLOGIST PROVIDED HISTORY: Reason for exam:->trauma What reading provider will be dictating this exam?->CRC FINDINGS: There is no appreciable pelvic fracture. No right or left hip fracture or dislocation is noted. There are degenerative changes the right left hips. There are degenerative changes of the lower lumbar spine. 1. There is no pelvic fracture and there is no right or left hip fracture or dislocation. 2. Degenerative changes of the hips.      Ct Head Wo Contrast    Result Date: 3/14/2021  EXAMINATION: CT OF THE HEAD WITHOUT CONTRAST  3/14/2021 10:57 am TECHNIQUE: CT of the head was performed without the administration of intravenous contrast. Dose modulation, iterative reconstruction, and/or weight based adjustment of the mA/kV was utilized to reduce the radiation dose to as low as reasonably achievable. COMPARISON: None HISTORY: ORDERING SYSTEM PROVIDED HISTORY: trauma TECHNOLOGIST PROVIDED HISTORY: Reason for exam:->trauma Has a \"code stroke\" or \"stroke alert\" been called? ->No Decision Support Exception->Emergency Medical Condition (MA) What reading provider will be dictating this exam?->CRC FINDINGS: BRAIN/VENTRICLES: There is an acute left temporoparietal subarachnoid hemorrhage that appears scattered in the left middle cranial fossa measuring 17 mm on axial image 52 and extending along the temporal lobe on axial image 41 measuring 3 mm in thickness. Near the parietal lobe it interdigitates into the sulci measuring 25 mm in diameter on axial image 51. This has the appearance of a contrecoup injury. There is a prominent cephalohematoma involving the right masseter and temporalis muscles causing a cephalohematoma overlying the right maxilla and zygoma. This most likely represents the primary injury. There are no definite signs of a displaced skull fracture. There is periventricular low density suggestive of microangiopathy. There is prominence of the sulci suggestive of cerebral and cerebellar atrophy. There are no signs of midline shift. The gray-white differentiation is maintained without evidence of an acute infarct. There is no evidence of hydrocephalus. ORBITS: The visualized portion of the orbits demonstrate no acute abnormality. SINUSES: The visualized paranasal sinuses reveals partial opacification of several ethmoid air cells suggestive of chronic ethmoiditis. There is an air-fluid level within the right maxillary and sphenoid sinuses suggestive of acute sinusitis.   The mastoid air cells demonstrate no acute abnormality. SOFT TISSUES/SKULL: No acute displaced skull fracture is visualized. There are also signs of a minimal left frontal scalp contusion. 1. There are scattered areas of subarachnoid hemorrhage involving the left temporoparietal lobes as noted above with the largest zone of subarachnoid hemorrhage measuring approximately 25 mm in the parietal region. This most likely represents a contrecoup injury and there are no signs of midline shift. 2. Prominent cephalohematoma involving the right extra-axial temporoparietal muscles where there is a zone of hemorrhage measuring 78 mm in length. There is also minimal left frontal scalp contusion. 3. Acute right maxillary and sphenoid sinusitis. 4. These findings were called to Dr. Ashlie Toscano at 11:41     Ct Facial Bones Wo Contrast    Result Date: 3/14/2021  EXAMINATION: CT OF THE FACE WITHOUT CONTRAST  3/14/2021 10:57 am TECHNIQUE: CT of the face was performed without the administration of intravenous contrast. Multiplanar reformatted images are provided for review. Dose modulation, iterative reconstruction, and/or weight based adjustment of the mA/kV was utilized to reduce the radiation dose to as low as reasonably achievable. COMPARISON: None HISTORY: ORDERING SYSTEM PROVIDED HISTORY: trauma TECHNOLOGIST PROVIDED HISTORY: Reason for exam:->trauma Decision Support Exception->Emergency Medical Condition (MA) What reading provider will be dictating this exam?->CRC FINDINGS: No evidence of facial bone fracture. Globes and orbits are intact. Air-fluid levels present in bilateral maxillary sinuses, sphenoid sinus and ethmoid sinuses. No evidence of facial bone fracture. Ct Chest Wo Contrast    Result Date: 3/14/2021  EXAMINATION: CT OF THE CHEST WITHOUT CONTRAST 3/14/2021 11:01 am TECHNIQUE: CT of the chest was performed without the administration of intravenous contrast. Multiplanar reformatted images are provided for review.  Dose modulation, iterative reconstruction, and/or weight based adjustment of the mA/kV was utilized to reduce the radiation dose to as low as reasonably achievable. COMPARISON: None. HISTORY: ORDERING SYSTEM PROVIDED HISTORY: trauma TECHNOLOGIST PROVIDED HISTORY: Reason for exam:->trauma Decision Support Exception->Emergency Medical Condition (MA) What reading provider will be dictating this exam?->CRC FINDINGS: Mediastinum: There is no mediastinal hematoma. The thoracic aorta is normal caliber. There is no aneurysm. The heart is at upper limits of normal in size. There is no pericardial effusion. Plaque is seen within the aortic root and within the coronary arteries. Lungs/pleura: Advanced emphysematous changes are noted. There is no pneumothorax or lung contusion. Mild interstitial fibrotic changes are noted. There is chronic scarring seen within the right and left lung bases. There is a 1 cm calcified granuloma seen within the lingula. There is a trace right pleural effusion. Upper Abdomen: The upper abdomen is unremarkable. Soft Tissues/Bones: There is an anterior wedge compression fracture of a midthoracic vertebral body likely T6. the age of the compression fracture is indeterminate. There is no fracture of the sternum. There is a minimally comminuted and minimally displaced fracture of the right 9th rib posteriorly. This is best appreciated on axial image number 80 series 3     1. Acute minimally displaced and minimally comminuted fracture of the right 9th rib posteriorly. 2. Anterior wedge compression fracture of the T6 vertebral body. The anterior vertebral Body is compressed by approximately 80%. There are no retropulsed fragments. The age of this compression deformity is indeterminate. If clinically warranted MRI of the thoracic spine can be obtained to evaluate for bone marrow edema. 3. Emphysematous changes. There is no lung contusion or pneumothorax.      Ct Cervical Spine Wo of a midthoracic vertebral body likely T6. The vertebral bodies compressed by approximately 80% anteriorly. There are no retropulsed fragments. The age of this compression deformity is indeterminate however I would favor a more acute compression fracture. There is a minimally displaced comminuted fracture of the right 9th rib posteriorly. There is also a fracture of the right 10th rib posteriorly. This fracture is nondisplaced. DEGENERATIVE CHANGES: Advanced multilevel degenerative disc and degenerative joint disease is noted. SOFT TISSUES: The paraspinal soft tissues are unremarkable. 1. Anterior wedge compression fracture of T6. The anterior vertebral body is compressed by approximately 80%. The age of this compression fracture is indeterminate however I would favor this compression fracture to be acute. Follow-up MRI of the thoracic spine is recommended if clinically warranted. 2. Minimally displaced fracture of the 9th right rib posteriorly 3. Nondisplaced fracture of the right 10th rib posteriorly. Ct Lumbar Spine Wo Contrast    Result Date: 3/14/2021  EXAMINATION: CT OF THE LUMBAR SPINE WITHOUT CONTRAST  3/14/2021 TECHNIQUE: CT of the lumbar spine was performed without the administration of intravenous contrast. Multiplanar reformatted images are provided for review. Dose modulation, iterative reconstruction, and/or weight based adjustment of the mA/kV was utilized to reduce the radiation dose to as low as reasonably achievable. COMPARISON: None HISTORY: ORDERING SYSTEM PROVIDED HISTORY: trauma TECHNOLOGIST PROVIDED HISTORY: Reason for exam:->trauma What reading provider will be dictating this exam?->CRC FINDINGS: BONES/ALIGNMENT: There is no acute compression fracture of the lumbar spine. There is 5.5 mm anterolisthesis of L4 on L5. The listhesis is secondary to the degenerative changes at the L4-5 level.  DEGENERATIVE CHANGES: Multilevel degenerative disc and degenerative joint disease is noted.  The degenerative changes are most prominent at the L4-5 level. SOFT TISSUES/RETROPERITONEUM: No paraspinal mass is seen. 1. There is no acute compression fracture of the lumbar spine 2. 5.5 mm anterolisthesis of L4 on L5 3. Multilevel degenerative disc and degenerative joint disease most prominent at the L4-5 level. Xr Chest 1 View    Result Date: 3/14/2021  EXAMINATION: ONE XRAY VIEW OF THE CHEST 3/14/2021 10:52 am COMPARISON: None. HISTORY: ORDERING SYSTEM PROVIDED HISTORY: trauma TECHNOLOGIST PROVIDED HISTORY: Reason for exam:->trauma What reading provider will be dictating this exam?->CRC FINDINGS: The cardiac silhouette is within normals. Emphysematous changes are noted. I suspect that there are interstitial fibrotic changes. There is no pneumothorax appreciated. There is no pleural effusion. 1. There is no pneumothorax or gross lung contusion 2. Emphysematous changes with prominence of the interstitial markings. The lungs will be further evaluated on the pending CT scan of the chest.       Discharge Exam:  Patient was     Disposition:     In process/preliminary results:  Outstanding Order Results     No orders found from 2021 to 3/15/2021.           Patient Instructions:   Discharge Medication List as of 3/15/2021 10:29 PM           Details   benztropine (COGENTIN) 1 MG tablet Take 1 mg by mouth 2 times dailyHistorical Med      bimatoprost (LUMIGAN) 0.01 % SOLN ophthalmic drops Place 1 drop into both eyes nightlyHistorical Med      brimonidine-timolol (COMBIGAN) 0.2-0.5 % ophthalmic solution Place 1 drop into both eyes every 12 hoursHistorical Med      dexlansoprazole (DEXILANT) 60 MG CPDR delayed release capsule Take 60 mg by mouth dailyHistorical Med      diclofenac sodium (VOLTAREN) 1 % GEL Apply 2 g topically 2 times daily, Topical, 2 TIMES DAILY, Historical Med      fenofibrate (TRICOR) 48 MG tablet Take 48 mg by mouth dailyHistorical Med      metoprolol succinate (TOPROL XL) 25 MG extended release tablet Take 25 mg by mouth 2 times dailyHistorical Med      simvastatin (ZOCOR) 20 MG tablet Take 20 mg by mouth nightlyHistorical Med             Hospice,  prior to discharge    Follow up:   No follow-up provider specified.      Signed:  Missy Muro MD  2021  5:10 AM